# Patient Record
Sex: MALE | Race: WHITE
[De-identification: names, ages, dates, MRNs, and addresses within clinical notes are randomized per-mention and may not be internally consistent; named-entity substitution may affect disease eponyms.]

---

## 2020-09-08 ENCOUNTER — HOSPITAL ENCOUNTER (EMERGENCY)
Dept: HOSPITAL 41 - JD.ED | Age: 70
Discharge: HOME | End: 2020-09-08
Payer: MEDICARE

## 2020-09-08 VITALS — HEART RATE: 92 BPM | DIASTOLIC BLOOD PRESSURE: 82 MMHG | SYSTOLIC BLOOD PRESSURE: 139 MMHG

## 2020-09-08 DIAGNOSIS — E66.9: ICD-10-CM

## 2020-09-08 DIAGNOSIS — M10.9: ICD-10-CM

## 2020-09-08 DIAGNOSIS — Z79.82: ICD-10-CM

## 2020-09-08 DIAGNOSIS — Z79.899: ICD-10-CM

## 2020-09-08 DIAGNOSIS — N41.0: Primary | ICD-10-CM

## 2020-09-08 DIAGNOSIS — I10: ICD-10-CM

## 2020-09-08 PROCEDURE — 99284 EMERGENCY DEPT VISIT MOD MDM: CPT

## 2020-09-08 PROCEDURE — 96365 THER/PROPH/DIAG IV INF INIT: CPT

## 2020-09-08 PROCEDURE — 86140 C-REACTIVE PROTEIN: CPT

## 2020-09-08 PROCEDURE — 84484 ASSAY OF TROPONIN QUANT: CPT

## 2020-09-08 PROCEDURE — 96361 HYDRATE IV INFUSION ADD-ON: CPT

## 2020-09-08 PROCEDURE — 85730 THROMBOPLASTIN TIME PARTIAL: CPT

## 2020-09-08 PROCEDURE — 84550 ASSAY OF BLOOD/URIC ACID: CPT

## 2020-09-08 PROCEDURE — 71045 X-RAY EXAM CHEST 1 VIEW: CPT

## 2020-09-08 PROCEDURE — 80053 COMPREHEN METABOLIC PANEL: CPT

## 2020-09-08 PROCEDURE — 85379 FIBRIN DEGRADATION QUANT: CPT

## 2020-09-08 PROCEDURE — 85025 COMPLETE CBC W/AUTO DIFF WBC: CPT

## 2020-09-08 PROCEDURE — 83880 ASSAY OF NATRIURETIC PEPTIDE: CPT

## 2020-09-08 PROCEDURE — 82553 CREATINE MB FRACTION: CPT

## 2020-09-08 PROCEDURE — 87086 URINE CULTURE/COLONY COUNT: CPT

## 2020-09-08 PROCEDURE — 93005 ELECTROCARDIOGRAM TRACING: CPT

## 2020-09-08 PROCEDURE — 81001 URINALYSIS AUTO W/SCOPE: CPT

## 2020-09-08 PROCEDURE — 85610 PROTHROMBIN TIME: CPT

## 2020-09-08 PROCEDURE — 36415 COLL VENOUS BLD VENIPUNCTURE: CPT

## 2020-09-08 PROCEDURE — 83735 ASSAY OF MAGNESIUM: CPT

## 2020-09-08 NOTE — CR
Chest: Portable view of the chest was obtained.

 

Comparison: No prior chest imaging is available.

 

Heart size is normal.  Tortuous thoracic aorta is seen.  Lungs are 

clear with no acute parenchymal change.  Bony structures are grossly 

intact.

 

Impression:

1.  Nothing acute is identified on portable chest x-ray.

 

Diagnostic code #1

 

This report was dictated in MDT

## 2020-09-08 NOTE — EDM.PDOC
ED HPI GENERAL MEDICAL PROBLEM





- General


Chief Complaint: Syncope


Stated Complaint: WEAK/LIGHTHEADED


Time Seen by Provider: 09/08/20 14:40


Source of Information: Reports: Patient


History Limitations: Reports: No Limitations





- History of Present Illness


INITIAL COMMENTS - FREE TEXT/NARRATIVE: 


70-year-old male presents to the ED for evaluation of a near syncopal event that

occurred while he was seated at his work desk in [facility].  He 

works as an .  He estimates that he was sitting for a period of 10 to 

15 minutes before he developed sudden onset of feeling ill.  By this he means he

felt lightheaded dizzy and generally unwell.  Coworkers commented that he looked

pallid and unhealthy.  After approximately 10 minutes he was assisted to the 

clinic in [place] where he was assessed by nursing staff and had an ECG 

carried out which was normal sinus rhythm.  They encouraged him to come to 

[place] for further evaluation.  He denies starting any new medications.  He 

does not take any energy drinks.  He rarely drinks any alcohol.  He does take a 

blood pressure pill in the morning and his protein shake in the morning.  He 

states he started to have a sandwich at dinnertime but did not like the taste 

and threw it away.  The ground seen was 144.  He states he generally still feels

a little unwell but much better than he did when this event occurred in the 

workplace.





Onset: Today, Sudden


Onset Date: 09/08/20


Onset Time: 13:30


Duration: Minutes:, Improving


Location: Reports: Generalized


Quality: Reports: Other


Severity: Moderate (Weakness dizziness lightheadedness and generally just not 

feeling well.)


Improves with: Reports: Other (Slowly improved with time.)


Worsens with: Reports: None


Context: Reports: Other (Spontaneous occurrence while seated at his desk at work

as an .).  Denies: Activity, Exercise, Lifting, Sick Contact, Trauma


Associated Symptoms: Reports: Malaise, Nausea/Vomiting, Weakness.  Denies: 

Confusion, Chest Pain, Cough, cough w sputum, Diaphoresis, Fever/Chills, 

Headaches, Loss of Appetite, Rash, Seizure, Shortness of Breath (Mild nausea), 

Syncope


Treatments PTA: Reports: Other (see below) (None.)





- Related Data


                                    Allergies











Allergy/AdvReac Type Severity Reaction Status Date / Time


 


No Known Allergies Allergy   Verified 05/20/15 08:37











Home Meds: 


                                    Home Meds





Benazepril HCl 20 mg PO DAILY 05/21/15 [History]


allopurinoL [Zyloprim] 100 mg PO DAILY 05/21/15 [History]


amLODIPine Besylate [Amlodipine Besylate] 5 mg PO DAILY 05/21/15 [History]


hydroCHLOROthiazide [Hydrochlorothiazide] 12.5 mg PO DAILY 05/21/15 [History]


Aspirin [Halfprin] 81 mg PO BEDTIME 09/08/20 [History]


Fish Oil/Omega-3 Fatty Acids [Fish Oil 1,000 MG] 1 gram PO DAILY 09/08/20 

[History]


Levofloxacin [Levaquin] 500 mg PO DAILY #9 tablet 09/08/20 [Rx]











Past Medical History


Cardiovascular History: Reports: High Cholesterol, Hypertension


Musculoskeletal History: Reports: Gout, Other (See Below)


Other Musculoskeletal History: left ankle fracture with cast care





- Infectious Disease History


Infectious Disease History: Reports: Chicken Pox, Measles, Mumps





Social & Family History





- Tobacco Use


Smoking Status *Q: Never Smoker





- Caffeine Use


Caffeine Use: Reports: Soda





- Recreational Drug Use


Recreational Drug Use: No





- Living Situation & Occupation


Living situation: Reports: 


Occupation: Employed (Works as an .)





ED ROS GENERAL





- Review of Systems


Review Of Systems: See Below


Constitutional: Reports: Malaise, Weakness, Fatigue, Decreased Appetite.  

Denies: Fever, Chills, Weight Loss


HEENT: Reports: Glasses


Respiratory: Denies: Shortness of Breath, Wheezing, Pleuritic Chest Pain, Cough,

 Sputum


Cardiovascular: Reports: Blood Pressure Problem, Lightheadedness (With this 

episode at 1330 hrs. today.).  Denies: Chest Pain, Claudication (Medication for 

blood pressure daily), Dyspnea on Exertion, Orthopnea, Palpitations


Endocrine: Reports: Fatigue


GI/Abdominal: Reports: No Symptoms


: Reports: Frequency, Other (Nocturia usually x2.)


Musculoskeletal: Reports: Joint Pain


Skin: Reports: No Symptoms (It is gout in his feet.  Has some arthritis in his 

knees hips low back at times)


Neurological: Reports: Dizziness (Dizziness today while seated at the 

workplace.).  Denies: Syncope


Psychiatric: Reports: No Symptoms


Hematologic/Lymphatic: Reports: No Symptoms


Immunologic: Reports: No Symptoms





ED EXAM, DIZZINESS





- Physical Exam


Exam: See Below


Exam Limited By: No Limitations


General Appearance: Alert, WD/WN, Anxious (Mildly apprehensive), Mild Distress, 

Other (Temperature is 36.4 with a heart rate of 91 is sinus respiratory is 20 O2

 sats are 98% on room air.  BP 1 [phone].)


Eye Exam: Bilateral Eye: Normal Inspection, PERRL


Throat/Mouth: Normal Inspection, Normal Lips, Normal Teeth, Normal Oropharynx


Head Exam: Atraumatic, Normocephalic


Neck: Normal Inspection, Supple, Non-Tender, Full Range of Motion.  No: Carotid 

Bruit, Lymphadenopathy (L), Lymphadenopathy (R)


Respiratory/Chest: No Respiratory Distress, Lungs Clear, Normal Breath Sounds, 

No Accessory Muscle Use, Chest Non-Tender


Cardiovascular: Normal Peripheral Pulses, Regular Rate, Rhythm, No Edema, No 

Gallop, No Murmur, No Rub


GI/Abdominal: Normal Bowel Sounds, Soft, Non-Tender, No Organomegaly, No 

Abnormal Bruit, No Mass, Pelvis Stable, Other (Mildly obese.  No surgical 

scars.)


Neurological: Alert, Normal Mood/Affect, Normal Dorsiflexion, CN II-XII Intact, 

Normal Plantar Flexion, Normal Reflexes, No Motor/Sensory Deficits, Oriented x 

3, Other (No pronator drift.  Rapid alternating movements are normal.  Finger-

nose assessment normal.)


DTR: 1+: Achilles (R), Achilles (L), 2+: Bicep (R), Bicep (L), Patella (R), 

Patella (L)


Back Exam: Normal Inspection, Full Range of Motion.  No: CVA Tenderness (L), CVA

 Tenderness (R)


Extremities: Normal Inspection, Normal Range of Motion, Non-Tender, No Pedal 

Edema


Psychiatric: Normal Affect, Normal Mood, Anxious


Skin Exam: Warm, Dry, Intact, Normal Color, No Rash (Oddly anxious of course.)





EKG INTERPRETATION


EKG Date: 09/08/20


Time: 14:49


Rhythm: NSR


Rate (Beats/Min): 88


Axis: LAD-Left Axis Deviation


P-Wave: Enlarged (Is 20 degrees left atrial hypertrophy)


QRS: Other (Early R wave transition V2 V3 suggesting right ventricular 

hypertrophy versus septal hypertrophy pattern there are Q waves in leads III and

 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


ST-T: Other (T wave flattening in aVF and lead III nonspecific)


QT: Normal


EKG Interpretation Comments: 





Abnormal ECG





Course





- Vital Signs


Last Recorded V/S: 


                                Last Vital Signs











Temp  36.2 C   09/08/20 18:34


 


Pulse  92   09/08/20 18:34


 


Resp  20   09/08/20 18:34


 


BP  139/82   09/08/20 18:34


 


Pulse Ox  95   09/08/20 18:34








                                        





Orthostatic Blood Pressure [     135/75


Standing]                        


Orthostatic Blood Pressure [     136/80


Sitting]                         


Orthostatic Blood Pressure [     134/69


Supine]                          











- Orders/Labs/Meds


Orders: 


                               Active Orders 24 hr











 Category Date Time Status


 


 CULTURE URINE [RM] Stat Lab  09/08/20 15:20 Received


 


 EKG 12 Lead [EK] Stat Ther  09/08/20 14:33 Ordered











Labs: 


                                Laboratory Tests











  09/08/20 09/08/20 09/08/20 Range/Units





  14:58 14:58 14:58 


 


WBC  11.86 H    (4.23-9.07)  K/mm3


 


RBC  5.15    (4.63-6.08)  M/mm3


 


Hgb  15.5    (13.7-17.5)  gm/dl


 


Hct  46.7    (40.1-51.0)  %


 


MCV  90.7    (79.0-92.2)  fl


 


MCH  30.1    (25.7-32.2)  pg


 


MCHC  33.2    (32.2-35.5)  g/dl


 


RDW Std Deviation  44.4 H    (35.1-43.9)  fL


 


Plt Count  273    (163-337)  K/mm3


 


MPV  9.2 L    (9.4-12.3)  fl


 


Neut % (Auto)  83.7 H    (34.0-67.9)  %


 


Lymph % (Auto)  9.4 L    (21.8-53.1)  %


 


Mono % (Auto)  5.5    (5.3-12.2)  %


 


Eos % (Auto)  0.6 L    (0.8-7.0)  


 


Baso % (Auto)  0.3    (0.1-1.2)  %


 


Neut # (Auto)  9.93 H    (1.78-5.38)  K/mm3


 


Lymph # (Auto)  1.12 L    (1.32-3.57)  K/mm3


 


Mono # (Auto)  0.65    (0.30-0.82)  K/mm3


 


Eos # (Auto)  0.07    (0.04-0.54)  K/mm3


 


Baso # (Auto)  0.03    (0.01-0.08)  K/mm3


 


Manual Slide Review  Abnormal smear    


 


PT   10.6   (9.7-11.7)  SECONDS


 


INR   0.99   


 


APTT   26   (22-31)  SECONDS


 


D-Dimer, Quantitative   0.26   (0.19-0.50)  mg/L


 


Sodium    136  (136-145)  mEq/L


 


Potassium    3.7  (3.5-5.1)  mEq/L


 


Chloride    97 L  ()  mEq/L


 


Carbon Dioxide    28  (21-32)  mEq/L


 


Anion Gap    14.7  (5-15)  


 


BUN    14  (7-18)  mg/dL


 


Creatinine    1.1  (0.7-1.3)  mg/dL


 


Est Cr Clr Drug Dosing    61.32  mL/min


 


Estimated GFR (MDRD)    > 60  (>60)  mL/min


 


BUN/Creatinine Ratio    12.7 L  (14-18)  


 


Glucose    129 H  ()  mg/dL


 


Uric Acid     (3.5-7.2)  mg/dL


 


Calcium    9.5  (8.5-10.1)  mg/dL


 


Magnesium    2.0  (1.8-2.4)  mg/dl


 


Total Bilirubin    0.4  (0.2-1.0)  mg/dL


 


AST    14 L  (15-37)  U/L


 


ALT    26  (16-63)  U/L


 


Alkaline Phosphatase    80  ()  U/L


 


CK-MB (CK-2)    1.2  (0-3.6)  ng/ml


 


Troponin I    < 0.017  (0.00-0.056)  ng/mL


 


C-Reactive Protein    1.2 H*  (<1.0)  mg/dL


 


NT-Pro-B Natriuret Pep     (0-125)  pg/mL


 


Total Protein    7.7  (6.4-8.2)  g/dl


 


Albumin    3.9  (3.4-5.0)  g/dl


 


Globulin    3.8  gm/dL


 


Albumin/Globulin Ratio    1.0  (1-2)  


 


Urine Color     (Yellow)  


 


Urine Appearance     (Clear)  


 


Urine pH     (5.0-8.0)  


 


Ur Specific Gravity     (1.005-1.030)  


 


Urine Protein     (Negative)  


 


Urine Glucose (UA)     (Negative)  


 


Urine Ketones     (Negative)  


 


Urine Occult Blood     (Negative)  


 


Urine Nitrite     (Negative)  


 


Urine Bilirubin     (Negative)  


 


Urine Urobilinogen     (0.2-1.0)  


 


Ur Leukocyte Esterase     (Negative)  


 


Urine RBC     (0-5)  /hpf


 


Urine WBC     (0-5)  /hpf


 


Ur Squamous Epith Cells     (0-5)  /hpf


 


Urine Bacteria     (FEW)  /hpf


 


Urine Mucus     (FEW)  /hpf














  09/08/20 09/08/20 09/08/20 Range/Units





  14:58 14:58 15:20 


 


WBC     (4.23-9.07)  K/mm3


 


RBC     (4.63-6.08)  M/mm3


 


Hgb     (13.7-17.5)  gm/dl


 


Hct     (40.1-51.0)  %


 


MCV     (79.0-92.2)  fl


 


MCH     (25.7-32.2)  pg


 


MCHC     (32.2-35.5)  g/dl


 


RDW Std Deviation     (35.1-43.9)  fL


 


Plt Count     (163-337)  K/mm3


 


MPV     (9.4-12.3)  fl


 


Neut % (Auto)     (34.0-67.9)  %


 


Lymph % (Auto)     (21.8-53.1)  %


 


Mono % (Auto)     (5.3-12.2)  %


 


Eos % (Auto)     (0.8-7.0)  


 


Baso % (Auto)     (0.1-1.2)  %


 


Neut # (Auto)     (1.78-5.38)  K/mm3


 


Lymph # (Auto)     (1.32-3.57)  K/mm3


 


Mono # (Auto)     (0.30-0.82)  K/mm3


 


Eos # (Auto)     (0.04-0.54)  K/mm3


 


Baso # (Auto)     (0.01-0.08)  K/mm3


 


Manual Slide Review     


 


PT     (9.7-11.7)  SECONDS


 


INR     


 


APTT     (22-31)  SECONDS


 


D-Dimer, Quantitative     (0.19-0.50)  mg/L


 


Sodium     (136-145)  mEq/L


 


Potassium     (3.5-5.1)  mEq/L


 


Chloride     ()  mEq/L


 


Carbon Dioxide     (21-32)  mEq/L


 


Anion Gap     (5-15)  


 


BUN     (7-18)  mg/dL


 


Creatinine     (0.7-1.3)  mg/dL


 


Est Cr Clr Drug Dosing     mL/min


 


Estimated GFR (MDRD)     (>60)  mL/min


 


BUN/Creatinine Ratio     (14-18)  


 


Glucose     ()  mg/dL


 


Uric Acid   4.9   (3.5-7.2)  mg/dL


 


Calcium     (8.5-10.1)  mg/dL


 


Magnesium     (1.8-2.4)  mg/dl


 


Total Bilirubin     (0.2-1.0)  mg/dL


 


AST     (15-37)  U/L


 


ALT     (16-63)  U/L


 


Alkaline Phosphatase     ()  U/L


 


CK-MB (CK-2)     (0-3.6)  ng/ml


 


Troponin I     (0.00-0.056)  ng/mL


 


C-Reactive Protein     (<1.0)  mg/dL


 


NT-Pro-B Natriuret Pep  19    (0-125)  pg/mL


 


Total Protein     (6.4-8.2)  g/dl


 


Albumin     (3.4-5.0)  g/dl


 


Globulin     gm/dL


 


Albumin/Globulin Ratio     (1-2)  


 


Urine Color    Yellow  (Yellow)  


 


Urine Appearance    Clear  (Clear)  


 


Urine pH    7.0  (5.0-8.0)  


 


Ur Specific Gravity    1.025  (1.005-1.030)  


 


Urine Protein    Negative  (Negative)  


 


Urine Glucose (UA)    Negative  (Negative)  


 


Urine Ketones    Negative  (Negative)  


 


Urine Occult Blood    Trace-intact H  (Negative)  


 


Urine Nitrite    Negative  (Negative)  


 


Urine Bilirubin    Negative  (Negative)  


 


Urine Urobilinogen    0.2  (0.2-1.0)  


 


Ur Leukocyte Esterase    Trace H  (Negative)  


 


Urine RBC    0-5  (0-5)  /hpf


 


Urine WBC    5-10 H  (0-5)  /hpf


 


Ur Squamous Epith Cells    Not seen  (0-5)  /hpf


 


Urine Bacteria    Few  (FEW)  /hpf


 


Urine Mucus    Few  (FEW)  /hpf











Meds: 


Medications














Discontinued Medications














Generic Name Dose Route Start Last Admin





  Trade Name Freq  PRN Reason Stop Dose Admin


 


Sodium Chloride  1,000 mls @ 500 mls/hr  09/08/20 15:00  09/08/20 15:21





  Normal Saline  IV   500 mls/hr





  ASDIRECTED ANDRIY   Administration


 


Levofloxacin/Dextrose 500 mg/  100 mls @ 100 mls/hr  09/08/20 16:49  09/08/20 

17:04





  Premix  IV  09/08/20 17:48  100 mls/hr





  ONETIME ONE   Administration














- Radiology Interpretation


Free Text/Narrative:: 





70-year-old male presents to the ED for evaluation of a near syncopal event that

 occurred while he was seated at his job as an  in [employer].  He 

believes he was sitting there for 10 to 15 minutes before he started to feel 

suddenly unwell with lightheadedness dizziness and coworkers commented that he 

appeared pale and ill in appearance.  He went to the clinic in [place] with 

their assistance they drove him.  And he was found to be in sinus rhythm at 

80/min.  No signs of acute ischemia were identified.  ECG done by triage nurse 

here shows evidence of early R wave transition suggestive of right ventricular 

hypertrophy versus septal hypertrophy pattern.  There are also Q waves in leads 

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 

signs of acute ischemic change.  Orthostatic BPs laying was 134/69 with a heart 

rate of 83 and standing it was 135/75 with a heart rate of 94 i.e. not 

orthostatic.  Therefore I do not have any reason for this spell to have 

occurred.  He denies being in any pain at the time of occurrence.  Cerner is 

therefore for some form of arrhythmia that may have precipitated his event.  It 

lasted between 5 and 8 minutes.  He will be on the monitor continuously while in

 the department.  Chest x-ray and routine lab work will be collected.  Chances 

are he will be sent home on a Holter monitor.





- Re-Assessments/Exams


Free Text/Narrative Re-Assessment/Exam: 





09/08/20 16:30 White count is mildly elevated at 11.86.  The differential shows 

84% neutrophils.  Hemoglobin is 15.5 with hematocrit of 46.7.  Platelet count is

 normal at 273,000.  Micro reveals lymphopenia with no bands cells seen.  PT is 

10.6 with an INR of 0.99.  PTT is 26 d-dimer is 0.26.  Sodium 136 with a 

potassium of 3.7.  Chloride is 97 with a bicarb of 28.  Anion gap is 14.7.  BUN 

is 14 with a creatinine of 1.1.  GFR is greater than 60.  Glucose is slightly 

elevated at 129.  Uric acid is normal at 4.9.  Calcium is 9.5 with a magnesium 

of 2.0.  Liver function is normal.  CK-MB fraction is 1.2 troponin I is less 

than 0.017 C-reactive protein is minimally elevated at 1.2.  Total protein is 

7.7 with an albumin fraction of 3.9.  Urinalysis shows trace of occult blood 

trace of leukocyte esterase and 5-10 WBCs per high-power field.  A urine culture

 will be ordered.  Chest x-ray done portably is completely normal.  Normal lung 

fields no sign of pneumonia.  Cardiac silhouette is normal as well.





09/08/20 16:50 I have discussed the findings with the patient.  Repeated his 

abdominal examination and he has no localized tenderness to suggest an occult 

source of infection in the abdomen.  Patient does feel just slightly warm to 

palpation.  He has had no chills.  He has had no arrhythmias at all while in the

 hospital.  Suspect that he may have an occult prostatitis.  Will place him on 

Levaquin 500 mg IV at this time.




















Departure





- Departure


Time of Disposition: 18:12


Disposition: Home, Self-Care 01


Condition: Fair


Clinical Impression: 


Prostatitis, unspecified


Qualifiers:


 Prostatitis type: acute Qualified Code(s): N41.0 - Acute prostatitis








- Discharge Information


*PRESCRIPTION DRUG MONITORING PROGRAM REVIEWED*: Not Applicable


*COPY OF PRESCRIPTION DRUG MONITORING REPORT IN PATIENT FRITZ: Not Applicable


Prescriptions: 


Levofloxacin [Levaquin] 500 mg PO DAILY #9 tablet


Instructions:  Prostatitis, Easy-to-Read


Referrals: 


Michael Mike MD [Primary Care Provider] - 


Forms:  ED Department Discharge


Additional Instructions: 


Evaluation in the emergency room today in regards to development of sudden onset

of illness while working at your desk this afternoon.  General sense of 

lightheadedness dizziness weakness and coworkers commented that you appeared 

quite pallid in appearance.  Clinically you may have a very low-grade fever.  

Lab tests revealed a slightly elevated white blood cell count with a left shift 

suggesting that you were fighting off an infection.  Chest x-ray proved to be 

negative.  You have no other signs or symptoms that would be worrisome for COVID

type illness.  Benign abdominal examination . The urinalysis did show 5-10 pus 

cells per high-power field jesting a low-grade urinary tract infection.    

Therefore the concern is whether or not you have a low-grade urinary tract 

infection most commonly arising  from the prostate gland which can be quite 

difficult to diagnose.  Decision made to treat you for this infection with 

Levaquin 500 mg initially given intravenously in the emergency department.  You 

will then have to take a 500 mg tablet once daily at suppertime for the next 9 

days to bring the infection under control.  Return to the emergency room if any 

further similar symptoms occur.  There was no evidence that you had any 

underlying heart irregular rhythms to account for your symptoms.  Follow-up with

personal care provider if any further problems occur.





Sepsis Event Note (ED)





- Evaluation


Sepsis Screening Result: No Definite Risk





- Focused Exam


Vital Signs: 


                                   Vital Signs











  Temp Pulse Resp BP Pulse Ox


 


 09/08/20 18:34  36.2 C  92  20  139/82  95


 


 09/08/20 14:29  36.4 C  91  20  144/74 H  98














- My Orders


Last 24 Hours: 


My Active Orders





09/08/20 14:33


EKG 12 Lead [EK] Stat 





09/08/20 15:20


CULTURE URINE [RM] Stat 














- Assessment/Plan


Last 24 Hours: 


My Active Orders





09/08/20 14:33


EKG 12 Lead [EK] Stat 





09/08/20 15:20


CULTURE URINE [RM] Stat

## 2023-08-28 ENCOUNTER — HOSPITAL ENCOUNTER (EMERGENCY)
Dept: HOSPITAL 41 - JD.ED | Age: 73
Discharge: NURSING, REHAB OR LONG-TERM CARE | End: 2023-08-28
Payer: COMMERCIAL

## 2023-08-28 VITALS — HEART RATE: 96 BPM | SYSTOLIC BLOOD PRESSURE: 106 MMHG | DIASTOLIC BLOOD PRESSURE: 59 MMHG

## 2023-08-28 DIAGNOSIS — S02.841A: ICD-10-CM
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
## 2023-08-28 DIAGNOSIS — S02.31XA: ICD-10-CM

## 2023-08-28 DIAGNOSIS — S82.451A: ICD-10-CM

## 2023-08-28 DIAGNOSIS — E78.00: ICD-10-CM

## 2023-08-28 DIAGNOSIS — S82.251A: ICD-10-CM

## 2023-08-28 DIAGNOSIS — S02.40EA: ICD-10-CM

## 2023-08-28 DIAGNOSIS — Z79.899: ICD-10-CM

## 2023-08-28 DIAGNOSIS — I10: ICD-10-CM

## 2023-08-28 DIAGNOSIS — S72.001A: Primary | ICD-10-CM

## 2023-08-28 DIAGNOSIS — Z79.82: ICD-10-CM

## 2023-08-28 DIAGNOSIS — Z23: ICD-10-CM

## 2023-08-28 LAB
ALBUMIN SERPL-MCNC: 3.8 G/DL (ref 3.4–5)
ALBUMIN/GLOB SERPL: 1 {RATIO} (ref 1–2)
ALP SERPL-CCNC: 74 U/L (ref 46–116)
ALT SERPL-CCNC: 36 U/L (ref 16–63)
ANION GAP SERPL CALC-SCNC: 15.8 MMOL/L (ref 5–15)
APTT PPP: 24.3 SECONDS (ref 21.7–31.4)
AST SERPL-CCNC: 27 U/L (ref 15–37)
BASOPHILS # BLD AUTO: 0.1 K/MM3 (ref 0–0.2)
BASOPHILS NFR BLD AUTO: 0.3 % (ref 0–1)
BILIRUB SERPL-MCNC: 0.8 MG/DL (ref 0.2–1)
BUN SERPL-MCNC: 19 MG/DL (ref 7–18)
BUN/CREAT SERPL: 14.6 (ref 14–18)
CALCIUM SERPL-MCNC: 9.4 MG/DL (ref 8.5–10.1)
CHLORIDE SERPL-SCNC: 98 MEQ/L (ref 98–107)
CO2 SERPL-SCNC: 25 MEQ/L (ref 21–32)
CREAT CL 24H UR+SERPL-VRATE: 48.02 ML/MIN
CREAT SERPL-MCNC: 1.3 MG/DL (ref 0.7–1.3)
EGFRCR SERPLBLD CKD-EPI 2021: 58 ML/MIN (ref 60–?)
EOSINOPHIL # BLD AUTO: 0.1 K/MM3 (ref 0–0.4)
EOSINOPHIL NFR BLD AUTO: 0.6 % (ref 0–6)
GLOBULIN SER-MCNC: 3.7 GM/DL
GLUCOSE SERPL-MCNC: 157 MG/DL (ref 70–99)
HCT VFR BLD AUTO: 40.6 % (ref 42–52)
HCT VFR BLD AUTO: 42.5 % (ref 42–52)
HGB BLD-MCNC: 13.8 GM/DL (ref 14–18)
HGB BLD-MCNC: 14.8 GM/DL (ref 14–18)
IMM GRANULOCYTES # BLD: 0.38 K/MM3 (ref 0–0.05)
IMM GRANULOCYTES NFR BLD: 1.7 % (ref 0–0.4)
INR PPP: 0.99
LYMPHOCYTES # BLD AUTO: 4.3 K/MM3 (ref 1–4.8)
LYMPHOCYTES NFR BLD AUTO: 19.6 % (ref 24–44)
MCH RBC QN AUTO: 31.4 PG (ref 28–32)
MCHC RBC AUTO-ENTMCNC: 34.8 G/DL (ref 32–36)
MCHC RBC AUTO-ENTMCNC: 90.2 FL (ref 83–99)
MONOCYTES # BLD AUTO: 1.1 K/MM3 (ref 0–0.8)
MONOCYTES NFR BLD AUTO: 4.9 % (ref 0–8)
NEUTROPHILS # BLD AUTO: 16.1 K/MM3 (ref 1.8–7.7)
NEUTROPHILS NFR BLD AUTO: 72.9 % (ref 41–71)
NRBC BLD AUTO-RTO: 0 % (ref 0–0.02)
NRBC BLD AUTO-RTO: 0 % (ref 0–0.2)
PLATELET # BLD AUTO: 318 K/MM3 (ref 150–400)
PMV BLD AUTO: 9.1 FL (ref 9.4–12.4)
POTASSIUM SERPL-SCNC: 3.8 MEQ/L (ref 3.5–5.1)
PROT SERPL-MCNC: 7.5 G/DL (ref 6.4–8.2)
PROTHROMBIN TIME: 10.6 SECONDS (ref 9.7–12)
RBC # BLD AUTO: 4.71 M/MM3 (ref 4.52–5.9)
SODIUM SERPL-SCNC: 135 MEQ/L (ref 136–145)
WBC # BLD AUTO: 22.05 K/MM3 (ref 3.9–11.3)

## 2023-08-28 PROCEDURE — 73552 X-RAY EXAM OF FEMUR 2/>: CPT

## 2023-08-28 PROCEDURE — 70450 CT HEAD/BRAIN W/O DYE: CPT

## 2023-08-28 PROCEDURE — 96365 THER/PROPH/DIAG IV INF INIT: CPT

## 2023-08-28 PROCEDURE — 86922 COMPATIBILITY TEST ANTIGLOB: CPT

## 2023-08-28 PROCEDURE — 96375 TX/PRO/DX INJ NEW DRUG ADDON: CPT

## 2023-08-28 PROCEDURE — 85730 THROMBOPLASTIN TIME PARTIAL: CPT

## 2023-08-28 PROCEDURE — 36430 TRANSFUSION BLD/BLD COMPNT: CPT

## 2023-08-28 PROCEDURE — 36415 COLL VENOUS BLD VENIPUNCTURE: CPT

## 2023-08-28 PROCEDURE — 71260 CT THORAX DX C+: CPT

## 2023-08-28 PROCEDURE — 86900 BLOOD TYPING SEROLOGIC ABO: CPT

## 2023-08-28 PROCEDURE — 72125 CT NECK SPINE W/O DYE: CPT

## 2023-08-28 PROCEDURE — 96368 THER/DIAG CONCURRENT INF: CPT

## 2023-08-28 PROCEDURE — 90715 TDAP VACCINE 7 YRS/> IM: CPT

## 2023-08-28 PROCEDURE — 74177 CT ABD & PELVIS W/CONTRAST: CPT

## 2023-08-28 PROCEDURE — 99285 EMERGENCY DEPT VISIT HI MDM: CPT

## 2023-08-28 PROCEDURE — 86850 RBC ANTIBODY SCREEN: CPT

## 2023-08-28 PROCEDURE — 80053 COMPREHEN METABOLIC PANEL: CPT

## 2023-08-28 PROCEDURE — 73590 X-RAY EXAM OF LOWER LEG: CPT

## 2023-08-28 PROCEDURE — 85610 PROTHROMBIN TIME: CPT

## 2023-08-28 PROCEDURE — 85025 COMPLETE CBC W/AUTO DIFF WBC: CPT

## 2023-08-28 PROCEDURE — 85018 HEMOGLOBIN: CPT

## 2023-08-28 PROCEDURE — P9016 RBC LEUKOCYTES REDUCED: HCPCS

## 2023-08-28 PROCEDURE — 96361 HYDRATE IV INFUSION ADD-ON: CPT

## 2023-08-28 PROCEDURE — 86901 BLOOD TYPING SEROLOGIC RH(D): CPT

## 2023-08-28 PROCEDURE — 90471 IMMUNIZATION ADMIN: CPT

## 2023-08-28 PROCEDURE — 85014 HEMATOCRIT: CPT
